# Patient Record
Sex: FEMALE | URBAN - METROPOLITAN AREA
[De-identification: names, ages, dates, MRNs, and addresses within clinical notes are randomized per-mention and may not be internally consistent; named-entity substitution may affect disease eponyms.]

---

## 2019-11-21 ENCOUNTER — OFFICE (OUTPATIENT)
Dept: URBAN - METROPOLITAN AREA CLINIC 19 | Facility: CLINIC | Age: 24
End: 2019-11-21

## 2019-11-21 VITALS
HEART RATE: 94 BPM | DIASTOLIC BLOOD PRESSURE: 90 MMHG | WEIGHT: 227 LBS | SYSTOLIC BLOOD PRESSURE: 135 MMHG | HEIGHT: 66 IN

## 2019-11-21 DIAGNOSIS — K21.9 GASTRO-ESOPHAGEAL REFLUX DISEASE WITHOUT ESOPHAGITIS: ICD-10-CM

## 2019-11-21 DIAGNOSIS — K59.00 CONSTIPATION, UNSPECIFIED: ICD-10-CM

## 2019-11-21 DIAGNOSIS — R10.12 LEFT UPPER QUADRANT PAIN: ICD-10-CM

## 2019-11-21 DIAGNOSIS — R07.81 PLEURODYNIA: ICD-10-CM

## 2019-11-21 DIAGNOSIS — R11.0 NAUSEA: ICD-10-CM

## 2019-11-21 LAB
ALPHA-GAL PANEL: ALPHA GAL IGE*: 8.61 KU/L — HIGH (ref ?–0.1)
ALPHA-GAL PANEL: BEEF (BOS SPP) IGE: 3.83 KU/L — HIGH (ref ?–0.35)
ALPHA-GAL PANEL: CLASS INTERPRETATION: 2
ALPHA-GAL PANEL: CLASS INTERPRETATION: 3
ALPHA-GAL PANEL: CLASS INTERPRETATION: 3
ALPHA-GAL PANEL: LAMB/MUTTON (OVIS SPP) IGE: 2.09 KU/L — HIGH (ref ?–0.35)
ALPHA-GAL PANEL: PORK (SUS SPP) IGE: 4.05 KU/L — HIGH (ref ?–0.35)
AMYLASE: 79 U/L (ref 31–124)
CELIAC DISEASE COMPREHENSIVE: DEAMIDATED GLIADIN ABS, IGA: 4 UNITS (ref 0–19)
CELIAC DISEASE COMPREHENSIVE: DEAMIDATED GLIADIN ABS, IGG: 2 UNITS (ref 0–19)
CELIAC DISEASE COMPREHENSIVE: ENDOMYSIAL ANTIBODY IGA: NEGATIVE
CELIAC DISEASE COMPREHENSIVE: IMMUNOGLOBULIN A, QN, SERUM: 352 MG/DL (ref 87–352)
CELIAC DISEASE COMPREHENSIVE: T-TRANSGLUTAMINASE (TTG) IGA: <2 U/ML
CELIAC DISEASE COMPREHENSIVE: T-TRANSGLUTAMINASE (TTG) IGG: <2 U/ML
FOOD ALLERGY PROFILE: CLASS DESCRIPTION: (no result)
FOOD ALLERGY PROFILE: F001-IGE EGG WHITE: 1.98 KU/L
FOOD ALLERGY PROFILE: F002-IGE MILK: 0.52 KU/L
FOOD ALLERGY PROFILE: F003-IGE CODFISH: <0.1 KU/L
FOOD ALLERGY PROFILE: F004-IGE WHEAT: 0.22 KU/L (ref 0–?)
FOOD ALLERGY PROFILE: F008-IGE CORN: 0.71 KU/L
FOOD ALLERGY PROFILE: F010-IGE SESAME SEED: 0.3 KU/L (ref 0–?)
FOOD ALLERGY PROFILE: F013-IGE PEANUT: 4.45 KU/L
FOOD ALLERGY PROFILE: F014-IGE SOYBEAN: 0.27 KU/L (ref 0–?)
FOOD ALLERGY PROFILE: F024-IGE SHRIMP: 0.15 KU/L (ref 0–?)
FOOD ALLERGY PROFILE: F207-IGE CLAM: <0.1 KU/L
FOOD ALLERGY PROFILE: F256-IGE WALNUT: 0.15 KU/L (ref 0–?)
FOOD ALLERGY PROFILE: F338-IGE SCALLOP: 0.17 KU/L (ref 0–?)
HEPATIC FUNCTION PANEL (7): ALBUMIN: 4.5 G/DL (ref 3.5–5.5)
HEPATIC FUNCTION PANEL (7): ALKALINE PHOSPHATASE: 80 IU/L (ref 39–117)
HEPATIC FUNCTION PANEL (7): ALT (SGPT): 24 IU/L (ref 0–32)
HEPATIC FUNCTION PANEL (7): AST (SGOT): 17 IU/L (ref 0–40)
HEPATIC FUNCTION PANEL (7): BILIRUBIN, DIRECT: 0.08 MG/DL (ref 0–0.4)
HEPATIC FUNCTION PANEL (7): BILIRUBIN, TOTAL: 0.3 MG/DL (ref 0–1.2)
HEPATIC FUNCTION PANEL (7): PROTEIN, TOTAL: 7 G/DL (ref 6–8.5)
LIPASE: 17 U/L (ref 14–72)

## 2019-11-21 PROCEDURE — 99204 OFFICE O/P NEW MOD 45 MIN: CPT | Performed by: INTERNAL MEDICINE

## 2019-12-10 ENCOUNTER — AMBULATORY SURGICAL CENTER (OUTPATIENT)
Dept: URBAN - METROPOLITAN AREA SURGERY 2 | Facility: SURGERY | Age: 24
End: 2019-12-10
Payer: COMMERCIAL

## 2019-12-10 ENCOUNTER — OFFICE (OUTPATIENT)
Dept: URBAN - METROPOLITAN AREA PATHOLOGY 22 | Facility: PATHOLOGY | Age: 24
End: 2019-12-10

## 2019-12-10 ENCOUNTER — AMBULATORY SURGICAL CENTER (OUTPATIENT)
Dept: URBAN - METROPOLITAN AREA SURGERY 2 | Facility: SURGERY | Age: 24
End: 2019-12-10

## 2019-12-10 VITALS
SYSTOLIC BLOOD PRESSURE: 118 MMHG | DIASTOLIC BLOOD PRESSURE: 60 MMHG | RESPIRATION RATE: 20 BRPM | DIASTOLIC BLOOD PRESSURE: 61 MMHG | DIASTOLIC BLOOD PRESSURE: 63 MMHG | HEART RATE: 90 BPM | SYSTOLIC BLOOD PRESSURE: 120 MMHG | HEART RATE: 72 BPM | HEART RATE: 91 BPM | SYSTOLIC BLOOD PRESSURE: 122 MMHG | HEART RATE: 72 BPM | SYSTOLIC BLOOD PRESSURE: 122 MMHG | OXYGEN SATURATION: 97 % | SYSTOLIC BLOOD PRESSURE: 118 MMHG | DIASTOLIC BLOOD PRESSURE: 60 MMHG | RESPIRATION RATE: 20 BRPM | DIASTOLIC BLOOD PRESSURE: 63 MMHG | HEART RATE: 91 BPM | DIASTOLIC BLOOD PRESSURE: 61 MMHG | HEART RATE: 72 BPM | TEMPERATURE: 98.7 F | HEART RATE: 90 BPM | DIASTOLIC BLOOD PRESSURE: 60 MMHG | DIASTOLIC BLOOD PRESSURE: 61 MMHG | HEART RATE: 84 BPM | DIASTOLIC BLOOD PRESSURE: 63 MMHG | HEART RATE: 90 BPM | HEIGHT: 66 IN | SYSTOLIC BLOOD PRESSURE: 122 MMHG | RESPIRATION RATE: 18 BRPM | WEIGHT: 223 LBS | SYSTOLIC BLOOD PRESSURE: 112 MMHG | SYSTOLIC BLOOD PRESSURE: 112 MMHG | HEIGHT: 66 IN | TEMPERATURE: 97.7 F | OXYGEN SATURATION: 97 % | SYSTOLIC BLOOD PRESSURE: 120 MMHG | RESPIRATION RATE: 18 BRPM | SYSTOLIC BLOOD PRESSURE: 112 MMHG | SYSTOLIC BLOOD PRESSURE: 118 MMHG | TEMPERATURE: 98.7 F | TEMPERATURE: 97.7 F | RESPIRATION RATE: 18 BRPM | OXYGEN SATURATION: 97 % | TEMPERATURE: 97.7 F | WEIGHT: 223 LBS | HEIGHT: 66 IN | RESPIRATION RATE: 20 BRPM | OXYGEN SATURATION: 96 % | OXYGEN SATURATION: 96 % | HEART RATE: 91 BPM | TEMPERATURE: 98.7 F | SYSTOLIC BLOOD PRESSURE: 120 MMHG | HEART RATE: 84 BPM | OXYGEN SATURATION: 96 % | HEART RATE: 84 BPM | WEIGHT: 223 LBS

## 2019-12-10 DIAGNOSIS — K59.00 CONSTIPATION, UNSPECIFIED: ICD-10-CM

## 2019-12-10 DIAGNOSIS — D12.5 BENIGN NEOPLASM OF SIGMOID COLON: ICD-10-CM

## 2019-12-10 DIAGNOSIS — R10.12 LEFT UPPER QUADRANT PAIN: ICD-10-CM

## 2019-12-10 PROCEDURE — 45380 COLONOSCOPY AND BIOPSY: CPT | Performed by: INTERNAL MEDICINE

## 2019-12-10 PROCEDURE — 88305 TISSUE EXAM BY PATHOLOGIST: CPT | Performed by: INTERNAL MEDICINE

## 2023-03-07 ENCOUNTER — OFFICE (OUTPATIENT)
Dept: URBAN - METROPOLITAN AREA CLINIC 19 | Facility: CLINIC | Age: 28
End: 2023-03-07

## 2023-03-07 VITALS
SYSTOLIC BLOOD PRESSURE: 121 MMHG | DIASTOLIC BLOOD PRESSURE: 82 MMHG | HEIGHT: 66 IN | HEART RATE: 86 BPM | WEIGHT: 221 LBS | OXYGEN SATURATION: 98 %

## 2023-03-07 DIAGNOSIS — K21.9 GASTRO-ESOPHAGEAL REFLUX DISEASE WITHOUT ESOPHAGITIS: ICD-10-CM

## 2023-03-07 DIAGNOSIS — R10.12 LEFT UPPER QUADRANT PAIN: ICD-10-CM

## 2023-03-07 DIAGNOSIS — R10.812 LEFT UPPER QUADRANT ABDOMINAL TENDERNESS: ICD-10-CM

## 2023-03-07 PROCEDURE — 99204 OFFICE O/P NEW MOD 45 MIN: CPT | Performed by: INTERNAL MEDICINE

## 2023-03-07 RX ORDER — HYOSCYAMINE SULFATE 0.12 MG/1
TABLET, ORALLY DISINTEGRATING ORAL
Qty: 30 | Refills: 3 | Status: COMPLETED
Start: 2023-03-07 | End: 2023-08-04

## 2023-03-08 LAB
AMYLASE: 72 U/L (ref 31–110)
CREATININE: 0.46 MG/DL — LOW (ref 0.57–1)
CREATININE: EGFR: 133 ML/MIN/1.73 (ref 59–?)
LIPASE: 21 U/L (ref 14–72)

## 2023-08-04 ENCOUNTER — OFFICE (OUTPATIENT)
Dept: URBAN - METROPOLITAN AREA CLINIC 19 | Facility: CLINIC | Age: 28
End: 2023-08-04
Payer: COMMERCIAL

## 2023-08-04 VITALS
DIASTOLIC BLOOD PRESSURE: 89 MMHG | WEIGHT: 227 LBS | SYSTOLIC BLOOD PRESSURE: 125 MMHG | HEIGHT: 66 IN | HEART RATE: 91 BPM | OXYGEN SATURATION: 98 %

## 2023-08-04 DIAGNOSIS — R10.812 LEFT UPPER QUADRANT ABDOMINAL TENDERNESS: ICD-10-CM

## 2023-08-04 DIAGNOSIS — R11.0 NAUSEA: ICD-10-CM

## 2023-08-04 DIAGNOSIS — K59.04 CHRONIC IDIOPATHIC CONSTIPATION: ICD-10-CM

## 2023-08-04 DIAGNOSIS — R10.13 EPIGASTRIC PAIN: ICD-10-CM

## 2023-08-04 DIAGNOSIS — R14.0 ABDOMINAL DISTENSION (GASEOUS): ICD-10-CM

## 2023-08-04 DIAGNOSIS — R10.11 RIGHT UPPER QUADRANT PAIN: ICD-10-CM

## 2023-08-04 PROCEDURE — 99214 OFFICE O/P EST MOD 30 MIN: CPT

## 2023-08-04 NOTE — SERVICEHPINOTES
28-year-old female is for a follow-up visit.  Patient continues to complain of upper abdominal discomfort and bloating.  The bloating has worsened over the last week.  She complains of a sense of fullness after eating.  She reports intermittent sharp left upper quadrant abdominal pain that radiates to her epigastric and right upper quadrant.  Patient took a pregnancy test the beginning of this week which was negative.  Patient continues to complain of nausea that is worse when she is eating.  Denies any vomiting.  Takes Tylenol as needed. Does not drink carbonated beverages. Switched to coconut milk. Bowel movements are regular, however she complains of incomplete evacuation.  Takes pantoprazole once daily which seems to control her reflux.  Labs from March 2023 revealed creatinine 0.46, amylase and lipase normal, CT abdomen with contrast revealed normal liver, pancreas, gallbladder, spleen, and appendix. Denies melena, hematochezia, or hematemesis. Avoids mammalian meat. Evaluated for abdominal pain in 2019.  CT abdomen and pelvis with contrast done outside revealed a 2.4 cm Bartholin's cyst in the left vagina and granulomatous calcifications in the right azygo esophageal as well as right infrahilar region.  Amylase, lipase and liver enzymes were normal.  Food allergy panel revealed multiple food allergies.  Alpha gal IgE was positive.  Celiac serology was negative.  Ultrasound done outside did not reveal any gallstones.  EGD done outside in 2019 revealed grade A esophagitis, erythema in the antrum and biopsies were negative for H pylori.  Gastric emptying study was normal.  Colonoscopy in 2019 revealed normal terminal ileum and 1 small sessile serrated adenoma was removed.  Requesting referral to a dietitian.

## 2023-08-23 ENCOUNTER — OFFICE (OUTPATIENT)
Dept: URBAN - METROPOLITAN AREA CLINIC 19 | Facility: CLINIC | Age: 28
End: 2023-08-23
Payer: COMMERCIAL

## 2023-08-23 DIAGNOSIS — R14.0 ABDOMINAL DISTENSION (GASEOUS): ICD-10-CM

## 2023-08-23 DIAGNOSIS — R10.13 EPIGASTRIC PAIN: ICD-10-CM

## 2023-08-23 PROCEDURE — 76705 ECHO EXAM OF ABDOMEN: CPT | Mod: TC | Performed by: INTERNAL MEDICINE

## 2023-09-20 ENCOUNTER — OFFICE (OUTPATIENT)
Dept: URBAN - METROPOLITAN AREA PATHOLOGY 12 | Facility: PATHOLOGY | Age: 28
End: 2023-09-20
Payer: COMMERCIAL

## 2023-09-20 ENCOUNTER — AMBULATORY SURGICAL CENTER (OUTPATIENT)
Dept: URBAN - METROPOLITAN AREA SURGERY 2 | Facility: SURGERY | Age: 28
End: 2023-09-20
Payer: COMMERCIAL

## 2023-09-20 VITALS
SYSTOLIC BLOOD PRESSURE: 118 MMHG | RESPIRATION RATE: 18 BRPM | OXYGEN SATURATION: 96 % | HEART RATE: 90 BPM | SYSTOLIC BLOOD PRESSURE: 129 MMHG | DIASTOLIC BLOOD PRESSURE: 81 MMHG | HEART RATE: 101 BPM | OXYGEN SATURATION: 95 % | HEART RATE: 101 BPM | OXYGEN SATURATION: 96 % | SYSTOLIC BLOOD PRESSURE: 119 MMHG | WEIGHT: 227 LBS | DIASTOLIC BLOOD PRESSURE: 79 MMHG | HEART RATE: 99 BPM | OXYGEN SATURATION: 95 % | RESPIRATION RATE: 17 BRPM | SYSTOLIC BLOOD PRESSURE: 119 MMHG | DIASTOLIC BLOOD PRESSURE: 78 MMHG | DIASTOLIC BLOOD PRESSURE: 79 MMHG | OXYGEN SATURATION: 94 % | HEART RATE: 101 BPM | DIASTOLIC BLOOD PRESSURE: 95 MMHG | OXYGEN SATURATION: 94 % | OXYGEN SATURATION: 94 % | TEMPERATURE: 97.3 F | RESPIRATION RATE: 18 BRPM | SYSTOLIC BLOOD PRESSURE: 118 MMHG | HEART RATE: 99 BPM | HEART RATE: 106 BPM | TEMPERATURE: 98.1 F | TEMPERATURE: 98.1 F | TEMPERATURE: 97.3 F | HEART RATE: 90 BPM | HEART RATE: 100 BPM | DIASTOLIC BLOOD PRESSURE: 95 MMHG | DIASTOLIC BLOOD PRESSURE: 95 MMHG | SYSTOLIC BLOOD PRESSURE: 144 MMHG | RESPIRATION RATE: 17 BRPM | HEART RATE: 106 BPM | WEIGHT: 227 LBS | DIASTOLIC BLOOD PRESSURE: 81 MMHG | DIASTOLIC BLOOD PRESSURE: 78 MMHG | DIASTOLIC BLOOD PRESSURE: 81 MMHG | DIASTOLIC BLOOD PRESSURE: 79 MMHG | OXYGEN SATURATION: 96 % | OXYGEN SATURATION: 95 % | HEIGHT: 66 IN | DIASTOLIC BLOOD PRESSURE: 74 MMHG | SYSTOLIC BLOOD PRESSURE: 144 MMHG | SYSTOLIC BLOOD PRESSURE: 129 MMHG | WEIGHT: 227 LBS | HEART RATE: 99 BPM | SYSTOLIC BLOOD PRESSURE: 119 MMHG | OXYGEN SATURATION: 98 % | HEART RATE: 90 BPM | SYSTOLIC BLOOD PRESSURE: 129 MMHG | OXYGEN SATURATION: 98 % | TEMPERATURE: 98.1 F | HEART RATE: 100 BPM | RESPIRATION RATE: 18 BRPM | OXYGEN SATURATION: 98 % | TEMPERATURE: 97.3 F | SYSTOLIC BLOOD PRESSURE: 118 MMHG | HEIGHT: 66 IN | DIASTOLIC BLOOD PRESSURE: 78 MMHG | DIASTOLIC BLOOD PRESSURE: 74 MMHG | HEART RATE: 100 BPM | SYSTOLIC BLOOD PRESSURE: 144 MMHG | HEIGHT: 66 IN | DIASTOLIC BLOOD PRESSURE: 74 MMHG | HEART RATE: 106 BPM | RESPIRATION RATE: 17 BRPM

## 2023-09-20 DIAGNOSIS — R11.0 NAUSEA: ICD-10-CM

## 2023-09-20 DIAGNOSIS — K21.9 GASTRO-ESOPHAGEAL REFLUX DISEASE WITHOUT ESOPHAGITIS: ICD-10-CM

## 2023-09-20 DIAGNOSIS — R10.12 LEFT UPPER QUADRANT PAIN: ICD-10-CM

## 2023-09-20 DIAGNOSIS — R14.0 ABDOMINAL DISTENSION (GASEOUS): ICD-10-CM

## 2023-09-20 DIAGNOSIS — K31.89 OTHER DISEASES OF STOMACH AND DUODENUM: ICD-10-CM

## 2023-09-20 DIAGNOSIS — K29.50 UNSPECIFIED CHRONIC GASTRITIS WITHOUT BLEEDING: ICD-10-CM

## 2023-09-20 PROCEDURE — 88305 TISSUE EXAM BY PATHOLOGIST: CPT | Performed by: STUDENT IN AN ORGANIZED HEALTH CARE EDUCATION/TRAINING PROGRAM

## 2023-09-20 PROCEDURE — 88313 SPECIAL STAINS GROUP 2: CPT | Performed by: STUDENT IN AN ORGANIZED HEALTH CARE EDUCATION/TRAINING PROGRAM

## 2023-09-20 PROCEDURE — 43239 EGD BIOPSY SINGLE/MULTIPLE: CPT | Performed by: INTERNAL MEDICINE

## 2023-09-20 PROCEDURE — 88342 IMHCHEM/IMCYTCHM 1ST ANTB: CPT | Performed by: STUDENT IN AN ORGANIZED HEALTH CARE EDUCATION/TRAINING PROGRAM

## 2023-09-25 LAB
GASTRO ONE PATHOLOGY: PDF REPORT: (no result)

## 2024-01-03 ENCOUNTER — OFFICE (OUTPATIENT)
Dept: URBAN - METROPOLITAN AREA CLINIC 19 | Facility: CLINIC | Age: 29
End: 2024-01-03

## 2024-01-03 VITALS
HEART RATE: 88 BPM | DIASTOLIC BLOOD PRESSURE: 77 MMHG | HEIGHT: 66 IN | OXYGEN SATURATION: 98 % | WEIGHT: 222 LBS | SYSTOLIC BLOOD PRESSURE: 116 MMHG

## 2024-01-03 DIAGNOSIS — R14.0 ABDOMINAL DISTENSION (GASEOUS): ICD-10-CM

## 2024-01-03 DIAGNOSIS — K59.00 CONSTIPATION, UNSPECIFIED: ICD-10-CM

## 2024-01-03 DIAGNOSIS — K21.9 GASTRO-ESOPHAGEAL REFLUX DISEASE WITHOUT ESOPHAGITIS: ICD-10-CM

## 2024-01-03 DIAGNOSIS — R10.13 EPIGASTRIC PAIN: ICD-10-CM

## 2024-01-03 PROCEDURE — 99213 OFFICE O/P EST LOW 20 MIN: CPT

## 2024-01-31 ENCOUNTER — OFFICE (OUTPATIENT)
Dept: URBAN - METROPOLITAN AREA CLINIC 19 | Facility: CLINIC | Age: 29
End: 2024-01-31
Payer: COMMERCIAL

## 2024-01-31 VITALS
OXYGEN SATURATION: 100 % | HEART RATE: 90 BPM | HEIGHT: 66 IN | DIASTOLIC BLOOD PRESSURE: 75 MMHG | SYSTOLIC BLOOD PRESSURE: 113 MMHG | WEIGHT: 211 LBS

## 2024-01-31 DIAGNOSIS — R10.13 EPIGASTRIC PAIN: ICD-10-CM

## 2024-01-31 DIAGNOSIS — K21.9 GASTRO-ESOPHAGEAL REFLUX DISEASE WITHOUT ESOPHAGITIS: ICD-10-CM

## 2024-01-31 DIAGNOSIS — R74.01 ELEVATION OF LEVELS OF LIVER TRANSAMINASE LEVELS: ICD-10-CM

## 2024-01-31 DIAGNOSIS — R11.0 NAUSEA: ICD-10-CM

## 2024-01-31 DIAGNOSIS — K59.00 CONSTIPATION, UNSPECIFIED: ICD-10-CM

## 2024-01-31 PROCEDURE — 99214 OFFICE O/P EST MOD 30 MIN: CPT

## 2024-01-31 RX ORDER — AMITRIPTYLINE HYDROCHLORIDE 10 MG/1
TABLET, FILM COATED ORAL
Qty: 30 | Refills: 1 | Status: COMPLETED
Start: 2024-01-31 | End: 2024-03-19

## 2024-01-31 RX ORDER — SUCRALFATE ORAL 1 G/10ML
SUSPENSION ORAL
Qty: 600 | Refills: 2 | Status: ACTIVE
Start: 2024-01-31

## 2024-01-31 NOTE — SERVICEHPINOTES
28-year-old female is for a follow up. Continues to report a burning sensation in her stomach after eating as well as occasional nausea and vomiting. Spicy, greasy, and fried foods seem to exacerbate the burning sensation in her stomach as well as her reflux.  She does not drink any carbonated beverages or sodas. She does avoid red meat and dairy. Does not take any NSAIDs.  She is taking lansoprazole twice daily which improved her symptoms. She is eating a bland diet.  Not taking Linzess daily and reports regular bowel movements. Denies melena or hematochezia or hematemesis.  Appears to have lost 11 lbs since her last clinic visit on 01/03/2024.
br
brEvaluated at [facility] in December 2023 for a burning sensation in the stomach as well as nausea with dry heaving.  CT A/P at that time revealed prior cholecystectomy with no biliary ductal dilation.  Normal liver, kidneys, pancreas, and spleen.  No acute abnormalities noted.   Labs revealed normal CBC and lipase.  Alkaline phosphatase was 124, , and . ED discharge summary was reviewed.br br Evaluated in August 2023 for abdominal pain and nausea.  Sucrose breath test at that time was normal.  Abdominal ultrasound revealed normal liver, gallbladder and kidneys.  HIDA scan with EF of 100% in September 2023.  EGD in September 2023 revealed normal mucosa in the esophagus and duodenum, and minimal erythema in the pre-pyloric region.  Biopsies were negative for H pylori /celiac sprue / Baptiste's. Gastric emptying test was normal in October 2023.  Patient underwent cholecystectomy with Dr. Daryn Clemens in November 2023 which has significantly improved her bloating and nausea. Labs from March 2023 revealed creatinine 0.46, amylase and lipase normal, CT abdomen with contrast revealed normal liver, pancreas, gallbladder, spleen, and appendix.  Avoids mammalian meat.Evaluated for abdominal pain in 2019.  CT abdomen and pelvis with contrast done outside revealed a 2.4 cm Bartholin's cyst in the left vagina and granulomatous calcifications in the right esophageal as well as right infrahilar region.  Amylase, lipase and liver enzymes were normal.  Food allergy panel revealed multiple food allergies.  Alpha gal IgE was positive.  Celiac serology was negative.  Ultrasound done outside did not reveal any gallstones.  EGD done outside in 2019 revealed grade A esophagitis, erythema in the antrum and biopsies were negative for H pylori.  Gastric emptying study was normal.  Colonoscopy in 2019 revealed normal terminal ileum and 1 small sessile serrated adenoma was removed.

## 2024-01-31 NOTE — SERVICENOTES
MD Addendum: I, Johanna Espinoza MD, independently interviewed and examined this patient and made modifications to the final HPI, exam, impression, and plan as initially scribed by Danica Beck NP.

## 2024-02-01 LAB
AMYLASE: 71 U/L (ref 31–110)
HEPATIC FUNCTION PANEL (7): ALBUMIN: 4.3 G/DL (ref 4–5)
HEPATIC FUNCTION PANEL (7): ALKALINE PHOSPHATASE: 72 IU/L (ref 44–121)
HEPATIC FUNCTION PANEL (7): ALT (SGPT): 14 IU/L (ref 0–32)
HEPATIC FUNCTION PANEL (7): AST (SGOT): 17 IU/L (ref 0–40)
HEPATIC FUNCTION PANEL (7): BILIRUBIN, DIRECT: 0.1 MG/DL (ref 0–0.4)
HEPATIC FUNCTION PANEL (7): BILIRUBIN, TOTAL: 0.3 MG/DL (ref 0–1.2)
HEPATIC FUNCTION PANEL (7): PROTEIN, TOTAL: 6.9 G/DL (ref 6–8.5)
LIPASE: 37 U/L (ref 14–72)

## 2024-03-19 ENCOUNTER — OFFICE (OUTPATIENT)
Dept: URBAN - METROPOLITAN AREA CLINIC 19 | Facility: CLINIC | Age: 29
End: 2024-03-19
Payer: COMMERCIAL

## 2024-03-19 VITALS
DIASTOLIC BLOOD PRESSURE: 74 MMHG | WEIGHT: 198 LBS | OXYGEN SATURATION: 99 % | HEIGHT: 66 IN | HEART RATE: 96 BPM | SYSTOLIC BLOOD PRESSURE: 119 MMHG

## 2024-03-19 DIAGNOSIS — K59.04 CHRONIC IDIOPATHIC CONSTIPATION: ICD-10-CM

## 2024-03-19 DIAGNOSIS — R74.01 ELEVATION OF LEVELS OF LIVER TRANSAMINASE LEVELS: ICD-10-CM

## 2024-03-19 DIAGNOSIS — K21.9 GASTRO-ESOPHAGEAL REFLUX DISEASE WITHOUT ESOPHAGITIS: ICD-10-CM

## 2024-03-19 DIAGNOSIS — R11.0 NAUSEA: ICD-10-CM

## 2024-03-19 DIAGNOSIS — D50.9 IRON DEFICIENCY ANEMIA, UNSPECIFIED: ICD-10-CM

## 2024-03-19 DIAGNOSIS — Z23 ENCOUNTER FOR IMMUNIZATION: ICD-10-CM

## 2024-03-19 DIAGNOSIS — R10.13 EPIGASTRIC PAIN: ICD-10-CM

## 2024-03-19 PROCEDURE — 90636 HEP A/HEP B VACC ADULT IM: CPT

## 2024-03-19 PROCEDURE — 99214 OFFICE O/P EST MOD 30 MIN: CPT | Mod: 25

## 2024-03-19 PROCEDURE — 90471 IMMUNIZATION ADMIN: CPT

## 2024-03-19 RX ORDER — SUCRALFATE ORAL 1 G/10ML
SUSPENSION ORAL
Qty: 600 | Refills: 2 | Status: ACTIVE
Start: 2024-01-31

## 2024-03-19 RX ORDER — LANSOPRAZOLE 30 MG/1
CAPSULE, DELAYED RELEASE PELLETS ORAL
Qty: 60 | Refills: 5 | Status: ACTIVE
Start: 2023-10-09

## 2024-03-19 NOTE — SERVICEHPINOTES
29-year-old female is for a follow up. Continues to report a burning sensation in her stomach after eating as well as occasional nausea. Not taking sucralfate and amitriptyline consistently.  She does not drink any carbonated beverages or sodas. She does avoid red meat and dairy. Does not take any NSAIDs.  She is taking lansoprazole twice daily which improved her symptoms. She is eating a bland diet and avoiding spicy foods.  Not taking Linzess and complains of mild constipation. Denies melena or hematochezia or hematemesis. Reports heavy menstrual cycles. Not taking any iron supplements. Appears to have lost 13 lbs since her last clinic visit on in January 2024. Labs at that time normal liver and pancreatic enzymes.  Iron saturation 5 and ferritin 12. Alkaline phosphatase isoenzymes were normal.  Alpha 1 antitrypsin phenotype was mm.  EUSEBIO negative.  Patient was not immune to hepatitis A or B.  Hepatitis-C antibody negative.  Normal SMA, ceruloplasmin, and mitochondrial antibody.  CMV IgM antibody and EBV IgM antibody negative. Evaluated at [facility] in December 2023 for a burning sensation in the stomach as well as nausea with dry heaving.  CT A/P at that time revealed prior cholecystectomy with no biliary ductal dilation.  Normal liver, kidneys, pancreas, and spleen.  No acute abnormalities noted.   Labs revealed normal CBC and lipase.  Alkaline phosphatase was 124, , and . ED discharge summary was reviewed. Evaluated in August 2023 for abdominal pain and nausea.  Sucrose breath test at that time was normal.  Abdominal ultrasound revealed normal liver, gallbladder and kidneys.  HIDA scan with EF of 100% in September 2023.  EGD in September 2023 revealed normal mucosa in the esophagus and duodenum, and minimal erythema in the pre-pyloric region.  Biopsies were negative for H pylori /celiac sprue / Baptiste's. Gastric emptying test was normal in October 2023.  Patient underwent cholecystectomy with Dr. Daryn Clemens in November 2023 which has significantly improved her bloating and nausea. Labs from March 2023 revealed creatinine 0.46, amylase and lipase normal, CT abdomen with contrast revealed normal liver, pancreas, gallbladder, spleen, and appendix.  Avoids mammalian meat.Evaluated for abdominal pain in 2019.  CT abdomen and pelvis with contrast done outside revealed a 2.4 cm Bartholin's cyst in the left vagina and granulomatous calcifications in the right esophageal as well as right infrahilar region.  Amylase, lipase and liver enzymes were normal.  Food allergy panel revealed multiple food allergies.  Alpha gal IgE was positive.  Celiac serology was negative.  Ultrasound done outside did not reveal any gallstones.  EGD done outside in 2019 revealed grade A esophagitis, erythema in the antrum and biopsies were negative for H pylori.  Gastric emptying study was normal.  Colonoscopy in 2019 revealed normal terminal ileum and 1 small sessile serrated adenoma was removed.

## 2024-04-19 ENCOUNTER — OFFICE (OUTPATIENT)
Dept: URBAN - METROPOLITAN AREA CLINIC 19 | Facility: CLINIC | Age: 29
End: 2024-04-19
Payer: COMMERCIAL

## 2024-04-19 VITALS — HEIGHT: 66 IN

## 2024-04-19 DIAGNOSIS — Z23 ENCOUNTER FOR IMMUNIZATION: ICD-10-CM

## 2024-04-19 PROCEDURE — 90636 HEP A/HEP B VACC ADULT IM: CPT | Performed by: INTERNAL MEDICINE

## 2024-04-19 PROCEDURE — 90471 IMMUNIZATION ADMIN: CPT | Performed by: INTERNAL MEDICINE

## 2024-06-25 ENCOUNTER — OFFICE (OUTPATIENT)
Dept: URBAN - METROPOLITAN AREA CLINIC 19 | Facility: CLINIC | Age: 29
End: 2024-06-25
Payer: COMMERCIAL

## 2024-06-25 VITALS
OXYGEN SATURATION: 97 % | HEART RATE: 71 BPM | SYSTOLIC BLOOD PRESSURE: 112 MMHG | HEIGHT: 66 IN | WEIGHT: 181 LBS | DIASTOLIC BLOOD PRESSURE: 63 MMHG

## 2024-06-25 DIAGNOSIS — R74.8 ABNORMAL LEVELS OF OTHER SERUM ENZYMES: ICD-10-CM

## 2024-06-25 DIAGNOSIS — K21.9 GASTRO-ESOPHAGEAL REFLUX DISEASE WITHOUT ESOPHAGITIS: ICD-10-CM

## 2024-06-25 DIAGNOSIS — R11.0 NAUSEA: ICD-10-CM

## 2024-06-25 DIAGNOSIS — R10.13 EPIGASTRIC PAIN: ICD-10-CM

## 2024-06-25 DIAGNOSIS — Z86.010 PERSONAL HISTORY OF COLONIC POLYPS: ICD-10-CM

## 2024-06-25 DIAGNOSIS — R74.01 ELEVATION OF LEVELS OF LIVER TRANSAMINASE LEVELS: ICD-10-CM

## 2024-06-25 DIAGNOSIS — K59.00 CONSTIPATION, UNSPECIFIED: ICD-10-CM

## 2024-06-25 DIAGNOSIS — D50.9 IRON DEFICIENCY ANEMIA, UNSPECIFIED: ICD-10-CM

## 2024-06-25 PROCEDURE — 99214 OFFICE O/P EST MOD 30 MIN: CPT

## 2024-12-11 ENCOUNTER — AMBULATORY SURGICAL CENTER (OUTPATIENT)
Dept: URBAN - METROPOLITAN AREA SURGERY 2 | Facility: SURGERY | Age: 29
End: 2024-12-11
Payer: COMMERCIAL

## 2024-12-11 VITALS
DIASTOLIC BLOOD PRESSURE: 54 MMHG | DIASTOLIC BLOOD PRESSURE: 60 MMHG | HEART RATE: 66 BPM | DIASTOLIC BLOOD PRESSURE: 69 MMHG | DIASTOLIC BLOOD PRESSURE: 63 MMHG | SYSTOLIC BLOOD PRESSURE: 104 MMHG | WEIGHT: 165 LBS | HEART RATE: 76 BPM | OXYGEN SATURATION: 95 % | SYSTOLIC BLOOD PRESSURE: 108 MMHG | RESPIRATION RATE: 18 BRPM | HEART RATE: 81 BPM | OXYGEN SATURATION: 98 % | HEART RATE: 63 BPM | HEIGHT: 66 IN | TEMPERATURE: 98.2 F | SYSTOLIC BLOOD PRESSURE: 100 MMHG | DIASTOLIC BLOOD PRESSURE: 67 MMHG | OXYGEN SATURATION: 100 % | TEMPERATURE: 99 F | RESPIRATION RATE: 16 BRPM | HEART RATE: 87 BPM | SYSTOLIC BLOOD PRESSURE: 105 MMHG | SYSTOLIC BLOOD PRESSURE: 109 MMHG | OXYGEN SATURATION: 99 % | OXYGEN SATURATION: 97 %

## 2024-12-11 DIAGNOSIS — Z86.0101 PERSONAL HISTORY OF ADENOMATOUS AND SERRATED COLON POLYPS: ICD-10-CM

## 2024-12-11 DIAGNOSIS — Z09 ENCOUNTER FOR FOLLOW-UP EXAMINATION AFTER COMPLETED TREATMEN: ICD-10-CM

## 2024-12-11 PROBLEM — Z86.010 SURVEILLANCE DUE TO PRIOR COLONIC NEOPLASIA: Status: ACTIVE | Noted: 2024-12-11

## 2024-12-11 PROCEDURE — G0105 COLORECTAL SCRN; HI RISK IND: HCPCS | Performed by: INTERNAL MEDICINE

## 2024-12-11 RX ADMIN — PROPOFOL 300 MG: 10 INJECTION, EMULSION INTRAVENOUS at 11:08

## 2025-01-22 ENCOUNTER — OFFICE (OUTPATIENT)
Dept: URBAN - METROPOLITAN AREA CLINIC 19 | Facility: CLINIC | Age: 30
End: 2025-01-22
Payer: COMMERCIAL

## 2025-01-22 VITALS
WEIGHT: 165 LBS | HEIGHT: 66 IN | DIASTOLIC BLOOD PRESSURE: 68 MMHG | HEART RATE: 66 BPM | OXYGEN SATURATION: 98 % | SYSTOLIC BLOOD PRESSURE: 129 MMHG

## 2025-01-22 DIAGNOSIS — R74.8 ABNORMAL LEVELS OF OTHER SERUM ENZYMES: ICD-10-CM

## 2025-01-22 DIAGNOSIS — R74.01 ELEVATION OF LEVELS OF LIVER TRANSAMINASE LEVELS: ICD-10-CM

## 2025-01-22 DIAGNOSIS — K21.9 GASTRO-ESOPHAGEAL REFLUX DISEASE WITHOUT ESOPHAGITIS: ICD-10-CM

## 2025-01-22 DIAGNOSIS — D50.9 IRON DEFICIENCY ANEMIA, UNSPECIFIED: ICD-10-CM

## 2025-01-22 DIAGNOSIS — R11.0 NAUSEA: ICD-10-CM

## 2025-01-22 DIAGNOSIS — K59.00 CONSTIPATION, UNSPECIFIED: ICD-10-CM

## 2025-01-22 DIAGNOSIS — R53.83 OTHER FATIGUE: ICD-10-CM

## 2025-01-22 PROCEDURE — 99214 OFFICE O/P EST MOD 30 MIN: CPT

## 2025-01-22 RX ORDER — LANSOPRAZOLE 30 MG/1
CAPSULE, DELAYED RELEASE PELLETS ORAL
Qty: 90 | Refills: 3 | Status: ACTIVE
Start: 2024-10-23

## 2025-01-22 RX ORDER — LINACLOTIDE 145 UG/1
CAPSULE, GELATIN COATED ORAL
Qty: 90 | Refills: 3 | Status: ACTIVE

## 2025-01-22 RX ORDER — FAMOTIDINE 40 MG/1
TABLET, FILM COATED ORAL
Qty: 90 | Refills: 3 | Status: ACTIVE
Start: 2025-01-22

## 2025-01-23 LAB
CBC, PLATELET, NO DIFFERENTIAL: HEMATOCRIT: 41.6 % (ref 34–46.6)
CBC, PLATELET, NO DIFFERENTIAL: HEMOGLOBIN: 13.8 G/DL (ref 11.1–15.9)
CBC, PLATELET, NO DIFFERENTIAL: MCH: 29.4 PG (ref 26.6–33)
CBC, PLATELET, NO DIFFERENTIAL: MCHC: 33.2 G/DL (ref 31.5–35.7)
CBC, PLATELET, NO DIFFERENTIAL: MCV: 89 FL (ref 79–97)
CBC, PLATELET, NO DIFFERENTIAL: PLATELETS: 236 X10E3/UL (ref 150–450)
CBC, PLATELET, NO DIFFERENTIAL: RBC: 4.7 X10E6/UL (ref 3.77–5.28)
CBC, PLATELET, NO DIFFERENTIAL: RDW: 12 % (ref 11.7–15.4)
CBC, PLATELET, NO DIFFERENTIAL: WBC: 4.9 X10E3/UL (ref 3.4–10.8)
FE+TIBC+FER: FERRITIN: 28 NG/ML (ref 15–150)
FE+TIBC+FER: IRON BIND.CAP.(TIBC): 336 UG/DL (ref 250–450)
FE+TIBC+FER: IRON SATURATION: 28 % (ref 15–55)
FE+TIBC+FER: IRON: 93 UG/DL (ref 27–159)
FE+TIBC+FER: UIBC: 243 UG/DL (ref 131–425)
HEPATIC FUNCTION PANEL (7): ALBUMIN: 4.3 G/DL (ref 4–5)
HEPATIC FUNCTION PANEL (7): ALKALINE PHOSPHATASE: 76 IU/L (ref 44–121)
HEPATIC FUNCTION PANEL (7): ALT (SGPT): 10 IU/L (ref 0–32)
HEPATIC FUNCTION PANEL (7): AST (SGOT): 10 IU/L (ref 0–40)
HEPATIC FUNCTION PANEL (7): BILIRUBIN, DIRECT: 0.15 MG/DL (ref 0–0.4)
HEPATIC FUNCTION PANEL (7): BILIRUBIN, TOTAL: 0.5 MG/DL (ref 0–1.2)
HEPATIC FUNCTION PANEL (7): PROTEIN, TOTAL: 6.7 G/DL (ref 6–8.5)
TSH: 1.7 UIU/ML (ref 0.45–4.5)
VITAMIN B12 AND FOLATE: FOLATE (FOLIC ACID), SERUM: 5.1 NG/ML (ref 3–?)
VITAMIN B12 AND FOLATE: VITAMIN B12: 263 PG/ML (ref 232–1245)
VITAMIN D, 25-HYDROXY: 11.6 NG/ML — LOW (ref 30–100)

## 2025-06-13 ENCOUNTER — TELEHEALTH PROVIDED OTHER THAN IN PATIENT'S HOME (OUTPATIENT)
Dept: URBAN - METROPOLITAN AREA CLINIC 19 | Facility: CLINIC | Age: 30
End: 2025-06-13
Payer: COMMERCIAL

## 2025-06-13 VITALS — HEIGHT: 66 IN

## 2025-06-13 DIAGNOSIS — R53.83 OTHER FATIGUE: ICD-10-CM

## 2025-06-13 DIAGNOSIS — R14.0 ABDOMINAL DISTENSION (GASEOUS): ICD-10-CM

## 2025-06-13 DIAGNOSIS — K58.9 IRRITABLE BOWEL SYNDROME, UNSPECIFIED: ICD-10-CM

## 2025-06-13 DIAGNOSIS — K21.9 GASTRO-ESOPHAGEAL REFLUX DISEASE WITHOUT ESOPHAGITIS: ICD-10-CM

## 2025-06-13 DIAGNOSIS — R11.0 NAUSEA: ICD-10-CM
